# Patient Record
Sex: MALE | URBAN - METROPOLITAN AREA
[De-identification: names, ages, dates, MRNs, and addresses within clinical notes are randomized per-mention and may not be internally consistent; named-entity substitution may affect disease eponyms.]

---

## 2018-12-14 ENCOUNTER — EMERGENCY (EMERGENCY)
Facility: HOSPITAL | Age: 33
LOS: 1 days | Discharge: HOME | End: 2018-12-14
Admitting: EMERGENCY MEDICINE
Payer: OTHER MISCELLANEOUS

## 2018-12-14 VITALS
HEART RATE: 76 BPM | TEMPERATURE: 98 F | OXYGEN SATURATION: 99 % | SYSTOLIC BLOOD PRESSURE: 132 MMHG | DIASTOLIC BLOOD PRESSURE: 84 MMHG | RESPIRATION RATE: 18 BRPM

## 2018-12-14 DIAGNOSIS — H57.13 OCULAR PAIN, BILATERAL: ICD-10-CM

## 2018-12-14 DIAGNOSIS — H57.89 OTHER SPECIFIED DISORDERS OF EYE AND ADNEXA: ICD-10-CM

## 2018-12-14 DIAGNOSIS — Z77.098 CONTACT WITH AND (SUSPECTED) EXPOSURE TO OTHER HAZARDOUS, CHIEFLY NONMEDICINAL, CHEMICALS: ICD-10-CM

## 2018-12-14 DIAGNOSIS — H53.8 OTHER VISUAL DISTURBANCES: ICD-10-CM

## 2018-12-14 PROCEDURE — 99283 EMERGENCY DEPT VISIT LOW MDM: CPT

## 2018-12-14 RX ORDER — ERYTHROMYCIN BASE 5 MG/GRAM
1 OINTMENT (GRAM) OPHTHALMIC (EYE)
Qty: 5 | Refills: 0
Start: 2018-12-14 | End: 2018-12-18

## 2018-12-14 NOTE — ED PROVIDER NOTE - NSFOLLOWUPINSTRUCTIONS_ED_ALL_ED_FT
Use opthalmic eye ointment in both eyes as prescribed.  Follow up with ophthalmologist.  Return for visual changes, increased pain or other concerns.

## 2018-12-14 NOTE — ED ADULT NURSE NOTE - NSIMPLEMENTINTERV_GEN_ALL_ED
Implemented All Universal Safety Interventions:  [place] to call system. Call bell, personal items and telephone within reach. Instruct patient to call for assistance. Room bathroom lighting operational. Non-slip footwear when patient is off stretcher. Physically safe environment: no spills, clutter or unnecessary equipment. Stretcher in lowest position, wheels locked, appropriate side rails in place.

## 2018-12-14 NOTE — ED PROVIDER NOTE - MEDICAL DECISION MAKING DETAILS
34 y/o M presents to ED c/o bilat eye redness and resolved pain s/p chemical exposure.  Pt had eye irrigated multiple times pta.  Pt pH check in bilat eyes over 1 hour after irrigation and nl.  Will treat with opthalmic ointment.  Pt advised to f/u with ophthalmologist.  Strict return precautions advised.

## 2018-12-14 NOTE — ED ADULT TRIAGE NOTE - CHIEF COMPLAINT QUOTE
pt bibems from work s/p spilling kitchen  in eyes, flushed by ems on scene, arrives with bl eye redness, no change in visual acuity

## 2018-12-14 NOTE — ED PROVIDER NOTE - BILATERAL EYES
diffuse erythema to slcera, no drainage.  no redness to periorbital region./pupils equal, round, and reactive to light/CONJUNCTIVA ERYTHEMA

## 2018-12-14 NOTE — ED PROVIDER NOTE - OBJECTIVE STATEMENT
32 yo M, with no PMHx, presenting to the ED with after accidentally splashing dish  in his eyes while at work today. He states placing the cleanser on the ground when it splashed up into his eyes. Pt rinsed eyes with water, while co workers called EMS. [employer] arrived and also irrigated pt's eyes. En route to ED, EMS irrigated eyes a third time. He staets the initial pain and blurred vision have resolved. Pt is unsure of the name of the  but will call co workers to find out. Does not wear contacts or glasses.

## 2019-10-21 NOTE — ED PROVIDER NOTE - NS ED SCRIBE ACP NAME FT
No future appointments.    Return in about 1 year (around 1/22/2020) for physical. confused Viv Colon, NP